# Patient Record
Sex: MALE | Race: WHITE | URBAN - METROPOLITAN AREA
[De-identification: names, ages, dates, MRNs, and addresses within clinical notes are randomized per-mention and may not be internally consistent; named-entity substitution may affect disease eponyms.]

---

## 2017-05-12 ENCOUNTER — EMERGENCY (EMERGENCY)
Facility: HOSPITAL | Age: 67
LOS: 1 days | Discharge: HOME | End: 2017-05-12
Attending: EMERGENCY MEDICINE | Admitting: EMERGENCY MEDICINE
Payer: MEDICARE

## 2017-05-12 VITALS
OXYGEN SATURATION: 98 % | HEART RATE: 96 BPM | DIASTOLIC BLOOD PRESSURE: 78 MMHG | RESPIRATION RATE: 16 BRPM | SYSTOLIC BLOOD PRESSURE: 146 MMHG

## 2017-05-12 PROCEDURE — 99283 EMERGENCY DEPT VISIT LOW MDM: CPT | Mod: GC

## 2017-05-12 RX ORDER — IBUPROFEN 200 MG
600 TABLET ORAL ONCE
Qty: 0 | Refills: 0 | Status: COMPLETED | OUTPATIENT
Start: 2017-05-12 | End: 2017-05-12

## 2017-05-12 RX ORDER — OXYCODONE HYDROCHLORIDE 5 MG/1
1 TABLET ORAL
Qty: 12 | Refills: 0 | OUTPATIENT
Start: 2017-05-12 | End: 2017-05-15

## 2017-05-12 RX ADMIN — Medication 600 MILLIGRAM(S): at 15:08

## 2017-05-12 NOTE — ED PROVIDER NOTE - ATTENDING CONTRIBUTION TO CARE
Reji SUAREZ- 65 y/o M p/w rt lower premolar area pain since this morning. No fever, chills, no recent surgery.Pt drank alcohol of pint. No injuries or fall    pt is alert, appear intoxicated with aob, s1s2 normal reg, b/l clear breathe sounds, abd soft, nt, nd, neuro exam aox3, cn 2-12 intact, oral exam- multiple caries noted in lower mandible, missing rt premorals on rt side, skin warm, dry, good turgor    ddx: dentla caries vs abscess

## 2017-05-12 NOTE — ED PROVIDER NOTE - TOOTH FINDINGS
tooth tenderness to percussion, right mandible lateral incisor; multiple teeth have been previously extracted and patient has global poor dentition/DENTAL CARIES/ENAMEL FRACTURE/DENTIN FRACTURE

## 2017-05-12 NOTE — ED PROVIDER NOTE - PROGRESS NOTE DETAILS
Reji SUAREZ- pt seen by dental and recommend outpatient tooth extraction, pain control for now, given percocet, pt is clinically sober now, walked on straight line, pain well controlled, discharged with percocet

## 2017-05-12 NOTE — ED PROVIDER NOTE - OBJECTIVE STATEMENT
67 yo male with PMH of CVA, dental caries and extractions presents to the ED with tooth pain x 3 days. Patient notes pain to his right mandibular incisor that he states has been gradually worsening for 3 days. Patient states he has been drinking about 1/2 pint of liquor for the pain x3 days. Denies taking other analgesics. Patient states he has not seen a dentist in several years. Denies fevers, chills, nausea, vomiting, palpitations, chest pain, cough, SOB, throat pain, difficulty breathing. Patient appears intoxicated and smells of EtOH on breath. Mildly agitated but non-violent.

## 2017-05-12 NOTE — ED PROVIDER NOTE - DIAGNOSIS COUNSELING, MDM
conducted a detailed discussion... I had a detailed discussion with the patient and/or guardian regarding the historical points, exam findings, and any diagnostic results supporting the discharge/admit diagnosis. Resident: 65 yo male with PMH of CVA, dental caries and extractions presents to the ED with tooth pain x 3 days. Plan: analgesia, dental consult, possible panorex dental xray  Attending: I had a detailed discussion with the patient and/or guardian regarding the historical points, exam findings, and any diagnostic results supporting the discharge/admit diagnosis.

## 2017-06-10 ENCOUNTER — EMERGENCY (EMERGENCY)
Facility: HOSPITAL | Age: 67
LOS: 1 days | End: 2017-06-10
Payer: OTHER GOVERNMENT

## 2017-06-10 PROCEDURE — 36410 VNPNXR 3YR/> PHY/QHP DX/THER: CPT

## 2017-06-10 PROCEDURE — 71010: CPT | Mod: 26

## 2017-06-10 PROCEDURE — 99284 EMERGENCY DEPT VISIT MOD MDM: CPT | Mod: 25

## 2017-07-24 ENCOUNTER — EMERGENCY (EMERGENCY)
Facility: HOSPITAL | Age: 67
LOS: 1 days | End: 2017-07-24
Payer: OTHER GOVERNMENT

## 2017-07-24 PROCEDURE — 99284 EMERGENCY DEPT VISIT MOD MDM: CPT

## 2017-07-25 PROCEDURE — 71010: CPT | Mod: 26

## 2017-07-25 PROCEDURE — 93970 EXTREMITY STUDY: CPT | Mod: 26

## 2017-08-17 ENCOUNTER — EMERGENCY (EMERGENCY)
Facility: HOSPITAL | Age: 67
LOS: 1 days | End: 2017-08-17
Payer: OTHER GOVERNMENT

## 2017-08-17 PROCEDURE — 99053 MED SERV 10PM-8AM 24 HR FAC: CPT

## 2017-08-17 PROCEDURE — 99285 EMERGENCY DEPT VISIT HI MDM: CPT | Mod: 25

## 2017-08-17 PROCEDURE — 71010: CPT | Mod: 26

## 2017-11-06 ENCOUNTER — EMERGENCY (EMERGENCY)
Facility: HOSPITAL | Age: 67
LOS: 1 days | Discharge: HOME | End: 2017-11-06
Attending: EMERGENCY MEDICINE | Admitting: EMERGENCY MEDICINE
Payer: MEDICARE

## 2017-11-06 VITALS
SYSTOLIC BLOOD PRESSURE: 128 MMHG | HEART RATE: 107 BPM | TEMPERATURE: 98 F | OXYGEN SATURATION: 100 % | RESPIRATION RATE: 18 BRPM | DIASTOLIC BLOOD PRESSURE: 81 MMHG

## 2017-11-06 PROCEDURE — 99284 EMERGENCY DEPT VISIT MOD MDM: CPT | Mod: GC

## 2017-11-06 RX ORDER — NITROGLYCERIN 6.5 MG
1 CAPSULE, EXTENDED RELEASE ORAL
Qty: 1 | Refills: 0 | OUTPATIENT
Start: 2017-11-06 | End: 2017-12-06

## 2017-11-06 RX ORDER — LIDOCAINE 4 G/100G
1 CREAM TOPICAL
Qty: 1 | Refills: 0 | OUTPATIENT
Start: 2017-11-06 | End: 2017-11-13

## 2017-11-06 NOTE — ED PROVIDER NOTE - ATTENDING CONTRIBUTION TO CARE
pt with rectal pain and burning x 1 week, episode of blood on toilet 1 week ago. has a h/o constipation. no a/p or f/c or h/o hemorrhoids. exam, vs wnl, nad hs and lungs normal, abdo benign, rectal with Dr. Kaplan present showed anal fissure at 6 o'clock. rectal exam showed no hemorrhoids and non-tender. pt with anal fissure. with the blood on toilet, I advised f/u GI for c-scope. anal fissure meds given.

## 2017-11-06 NOTE — ED SUB INTERN NOTE - MEDICAL DECISION MAKING DETAILS
Pt is a 66 yo M PMHx significant for chronic alcohol use, injection drug use, p/w rectal pain 2/2 anal fissure (+ anal fissure on exam, no hemorrhoids found on exam). Will dc home; pt will f/u w/ outpatient PMD.

## 2017-11-06 NOTE — ED PROVIDER NOTE - OBJECTIVE STATEMENT
66yo m pmh ETOH abuse, ex-IVDA user, p/w rectal pain. Pain x 2 weeks, worse with BMs. hx of constipation. No fevers, chills, abdominal pain. +BRBPR. No hx of anal intercourse.

## 2017-11-06 NOTE — ED ADULT TRIAGE NOTE - CHIEF COMPLAINT QUOTE
Pt co rash to rectum x 1 week. Has been using antibiotic ointment on it but pain has become worse. States he cleaned himself yesterday and saw blood. Denies pmh. Pt co rash to rectum x 1 week. Unsure if there are open sores. Has been using antibiotic ointment but pain has become worse. States he cleaned himself yesterday and saw blood. Denies pmh.

## 2017-11-06 NOTE — ED SUB INTERN NOTE - PHYSICAL EXAMINATION
GENERAL APPEARANCE: Well appearing  CARDIAC: Normal S1 and S2. No S3, S4 or murmurs. Rhythm is regular.  LUNGS: Clear to auscultation and percussion without rales, rhonchi, wheezing or diminished breath sounds.  ABDOMEN: Positive bowel sounds. Soft, nondistended, nontender. No guarding or rebound.   EXTREMITIES: 2+ pitting edema in LE  : Midline inferior anal fissure

## 2017-11-06 NOTE — ED PROVIDER NOTE - PROGRESS NOTE DETAILS
Indra Kaplan MD PGY3: Patient informed of ED visit findings, understands plan.  Patient instructed to follow up with their primary care physician in 2-3 days and return for new, worsened, or persistent symptoms. Pt given f/u information for Gastroenterology.

## 2017-11-06 NOTE — ED SUB INTERN NOTE - OBJECTIVE STATEMENT FT
Pt is a 66 yo M PMHx significant for chronic alcohol use, injection drug use, p/w rectal pain. Pt reported that the pain began 1 week ago after a bowel movment.  Pt reported that he noticed some blood after wiping. Pt reported that the pain progressively worsened. Pt described the pain as constant 10/10 sharp non radiating pain made worse by movement with no alleviating factors.

## 2018-01-13 ENCOUNTER — EMERGENCY (EMERGENCY)
Facility: HOSPITAL | Age: 68
LOS: 0 days | Discharge: HOME | End: 2018-01-13
Attending: EMERGENCY MEDICINE | Admitting: EMERGENCY MEDICINE
Payer: MEDICARE

## 2018-01-13 VITALS
HEART RATE: 116 BPM | SYSTOLIC BLOOD PRESSURE: 91 MMHG | DIASTOLIC BLOOD PRESSURE: 54 MMHG | WEIGHT: 250 LBS | TEMPERATURE: 99 F | HEIGHT: 74 IN | RESPIRATION RATE: 13 BRPM | OXYGEN SATURATION: 99 %

## 2018-01-13 VITALS
SYSTOLIC BLOOD PRESSURE: 131 MMHG | DIASTOLIC BLOOD PRESSURE: 77 MMHG | HEART RATE: 103 BPM | TEMPERATURE: 98 F | RESPIRATION RATE: 20 BRPM | OXYGEN SATURATION: 98 %

## 2018-01-13 DIAGNOSIS — R41.82 ALTERED MENTAL STATUS, UNSPECIFIED: ICD-10-CM

## 2018-01-13 DIAGNOSIS — Z86.73 PERSONAL HISTORY OF TRANSIENT ISCHEMIC ATTACK (TIA), AND CEREBRAL INFARCTION WITHOUT RESIDUAL DEFICITS: ICD-10-CM

## 2018-01-13 DIAGNOSIS — R09.02 HYPOXEMIA: ICD-10-CM

## 2018-01-13 LAB
ALBUMIN SERPL ELPH-MCNC: 3.4 G/DL — SIGNIFICANT CHANGE UP (ref 3.3–5)
ALP SERPL-CCNC: 101 U/L — SIGNIFICANT CHANGE UP (ref 40–120)
ALT FLD-CCNC: 58 U/L — SIGNIFICANT CHANGE UP (ref 12–78)
ANION GAP SERPL CALC-SCNC: 7 MMOL/L — SIGNIFICANT CHANGE UP (ref 5–17)
APAP SERPL-MCNC: <2 UG/ML — LOW (ref 10–30)
AST SERPL-CCNC: 33 U/L — SIGNIFICANT CHANGE UP (ref 15–37)
BASOPHILS # BLD AUTO: 0.1 K/UL — SIGNIFICANT CHANGE UP (ref 0–0.2)
BASOPHILS NFR BLD AUTO: 1.5 % — SIGNIFICANT CHANGE UP (ref 0–2)
BILIRUB SERPL-MCNC: 0.2 MG/DL — SIGNIFICANT CHANGE UP (ref 0.2–1.2)
BUN SERPL-MCNC: 19 MG/DL — SIGNIFICANT CHANGE UP (ref 7–23)
CALCIUM SERPL-MCNC: 8.8 MG/DL — SIGNIFICANT CHANGE UP (ref 8.5–10.1)
CHLORIDE SERPL-SCNC: 102 MMOL/L — SIGNIFICANT CHANGE UP (ref 96–108)
CO2 SERPL-SCNC: 28 MMOL/L — SIGNIFICANT CHANGE UP (ref 22–31)
CREAT SERPL-MCNC: 1.41 MG/DL — HIGH (ref 0.5–1.3)
EOSINOPHIL # BLD AUTO: 0.2 K/UL — SIGNIFICANT CHANGE UP (ref 0–0.5)
EOSINOPHIL NFR BLD AUTO: 3 % — SIGNIFICANT CHANGE UP (ref 0–6)
GLUCOSE SERPL-MCNC: 150 MG/DL — HIGH (ref 70–99)
HCT VFR BLD CALC: 43.1 % — SIGNIFICANT CHANGE UP (ref 39–50)
HGB BLD-MCNC: 13.8 G/DL — SIGNIFICANT CHANGE UP (ref 13–17)
LYMPHOCYTES # BLD AUTO: 2.2 K/UL — SIGNIFICANT CHANGE UP (ref 1–3.3)
LYMPHOCYTES # BLD AUTO: 27.6 % — SIGNIFICANT CHANGE UP (ref 13–44)
MCHC RBC-ENTMCNC: 32 GM/DL — SIGNIFICANT CHANGE UP (ref 32–36)
MCHC RBC-ENTMCNC: 32.3 PG — SIGNIFICANT CHANGE UP (ref 27–34)
MCV RBC AUTO: 100.9 FL — HIGH (ref 80–100)
MONOCYTES # BLD AUTO: 0.7 K/UL — SIGNIFICANT CHANGE UP (ref 0–0.9)
MONOCYTES NFR BLD AUTO: 8.4 % — SIGNIFICANT CHANGE UP (ref 2–14)
NEUTROPHILS # BLD AUTO: 4.8 K/UL — SIGNIFICANT CHANGE UP (ref 1.8–7.4)
NEUTROPHILS NFR BLD AUTO: 59.6 % — SIGNIFICANT CHANGE UP (ref 43–77)
PLATELET # BLD AUTO: 328 K/UL — SIGNIFICANT CHANGE UP (ref 150–400)
POTASSIUM SERPL-MCNC: 3.8 MMOL/L — SIGNIFICANT CHANGE UP (ref 3.5–5.3)
POTASSIUM SERPL-SCNC: 3.8 MMOL/L — SIGNIFICANT CHANGE UP (ref 3.5–5.3)
PROT SERPL-MCNC: 7.8 GM/DL — SIGNIFICANT CHANGE UP (ref 6–8.3)
RBC # BLD: 4.28 M/UL — SIGNIFICANT CHANGE UP (ref 4.2–5.8)
RBC # FLD: 13.6 % — SIGNIFICANT CHANGE UP (ref 10.3–14.5)
SALICYLATES SERPL-MCNC: 1.7 MG/DL — LOW (ref 2.8–20)
SODIUM SERPL-SCNC: 137 MMOL/L — SIGNIFICANT CHANGE UP (ref 135–145)
TSH SERPL-MCNC: 10.4 UIU/ML — HIGH (ref 0.36–3.74)
WBC # BLD: 8.1 K/UL — SIGNIFICANT CHANGE UP (ref 3.8–10.5)
WBC # FLD AUTO: 8.1 K/UL — SIGNIFICANT CHANGE UP (ref 3.8–10.5)

## 2018-01-13 PROCEDURE — 99291 CRITICAL CARE FIRST HOUR: CPT

## 2018-01-13 RX ORDER — SODIUM CHLORIDE 9 MG/ML
1000 INJECTION INTRAMUSCULAR; INTRAVENOUS; SUBCUTANEOUS ONCE
Qty: 0 | Refills: 0 | Status: COMPLETED | OUTPATIENT
Start: 2018-01-13 | End: 2018-01-13

## 2018-01-13 RX ORDER — NALOXONE HYDROCHLORIDE 4 MG/.1ML
0.4 SPRAY NASAL ONCE
Qty: 0 | Refills: 0 | Status: COMPLETED | OUTPATIENT
Start: 2018-01-13 | End: 2018-01-13

## 2018-01-13 RX ORDER — NALOXONE HYDROCHLORIDE 4 MG/.1ML
2 SPRAY NASAL ONCE
Qty: 0 | Refills: 0 | Status: COMPLETED | OUTPATIENT
Start: 2018-01-13 | End: 2018-01-13

## 2018-01-13 RX ADMIN — NALOXONE HYDROCHLORIDE 2 MILLIGRAM(S): 4 SPRAY NASAL at 02:35

## 2018-01-13 RX ADMIN — NALOXONE HYDROCHLORIDE 0.4 MILLIGRAM(S): 4 SPRAY NASAL at 01:50

## 2018-01-13 RX ADMIN — SODIUM CHLORIDE 2000 MILLILITER(S): 9 INJECTION INTRAMUSCULAR; INTRAVENOUS; SUBCUTANEOUS at 01:50

## 2018-01-13 NOTE — ED PROVIDER NOTE - OBJECTIVE STATEMENT
[place]. denies si or hi denies hallucinations. denies fever. denies HA or neck pain. no chest pain or sob. no abd pain. no n/v/d. no urinary f/u/d. no back pain. no motor or sensory deficits. denies illicit drug use. no recent travel. no rash. no other acute issues symptoms or concerns

## 2018-01-13 NOTE — ED PROVIDER NOTE - MEDICAL DECISION MAKING DETAILS
ams resolved neuro status showing neuro: CN II - XII intact, EOMI, PERRL, no papilledema, 5/5 muscle strength x 4 extremities, no sensory deficits, 2+ dtr globally, negative babinski, no ataxic gait, normal DEJON and FNT, normal romberg   likely opiod abuise no si or hi or hallucinations pt asking for dispo he is back to baseline mental status no signs trauma

## 2018-01-13 NOTE — ED ADULT NURSE NOTE - OBJECTIVE STATEMENT
pt [place] and could not get up. pt pupil pinpoint, denies drug use, admits to two drinks of rum and coke. pt arousable at times but appears lethargic.

## 2018-01-13 NOTE — ED PROVIDER NOTE - PROGRESS NOTE DETAILS
not hypoxic over 2 hours need for line placemembnt narcan multiple bedside reassessments pulmonary status

## 2018-01-13 NOTE — ED ADULT TRIAGE NOTE - CHIEF COMPLAINT QUOTE
Pt BIBA d/t AMS, states he was [place] train [place] acting unusual, pt denies use of drugs, states he had 2 drinks today but that's all. Pt currently acting lethargic, arousable when attempts are made.

## 2018-01-13 NOTE — ED PROVIDER NOTE - CRITICAL CARE PROVIDED
direct patient care (not related to procedure)/interpretation of diagnostic studies/additional history taking

## 2018-01-14 NOTE — ED POST DISCHARGE NOTE - REASON FOR FOLLOW-UP
Other Elevated TSH. There is nor phone number on pt chart to call him and acknowledge him of the result. Certified letter needs to be sent.-Paco Pisano PA-C

## 2022-02-08 NOTE — ED PROVIDER NOTE - NS ED ATTENDING STATEMENT MOD
I have personally seen and examined this patient. I have fully participated in the care of this patient. I have reviewed all pertinent clinical information, including history physical exam, plan and the Resident's note and agree except as noted ---

## 2025-06-12 NOTE — ED PROVIDER NOTE - NEURO NEGATIVE STATEMENT, MLM
left arm/paresthesias no loss of consciousness, no gait abnormality, no headache, no sensory deficits, and + weakness.